# Patient Record
Sex: MALE | Race: OTHER
[De-identification: names, ages, dates, MRNs, and addresses within clinical notes are randomized per-mention and may not be internally consistent; named-entity substitution may affect disease eponyms.]

---

## 2018-01-16 ENCOUNTER — HOSPITAL ENCOUNTER (EMERGENCY)
Dept: HOSPITAL 74 - FER | Age: 43
Discharge: HOME | End: 2018-01-16
Payer: COMMERCIAL

## 2018-01-16 VITALS — BODY MASS INDEX: 37 KG/M2

## 2018-01-16 VITALS — HEART RATE: 92 BPM | DIASTOLIC BLOOD PRESSURE: 91 MMHG | TEMPERATURE: 99.3 F | SYSTOLIC BLOOD PRESSURE: 139 MMHG

## 2018-01-16 DIAGNOSIS — R51: Primary | ICD-10-CM

## 2018-01-16 DIAGNOSIS — I10: ICD-10-CM

## 2018-01-16 NOTE — PDOC
History of Present Illness





- General


Chief Complaint: Headache


Stated Complaint: HEADACHE


Time Seen by Provider: 01/16/18 11:25


History Source: Patient (Patient walked in complaining of headaches since he 

finished his BP medication 3 days ago. The new one will arrive in a couple of 

days)


Exam Limitations: No Limitations





- History of Present Illness


Timing/Duration: reports: 24 hours


Severity: Yes: moderate


Associated Symptoms: reports: denies symptoms





Past History





- Travel


Traveled outside of the country in the last 30 days: No


Close contact w/someone who was outside of country & ill: No





- Past Medical History


Allergies/Adverse Reactions: 


 Allergies











Allergy/AdvReac Type Severity Reaction Status Date / Time


 


No Known Allergies Allergy   Verified 01/16/18 11:27











Home Medications: 


Ambulatory Orders





Acetaminophen [Tylenol -] 1,000 mg PO ONCE PRN 01/16/18 


Lisinopril/Hydrochlorothiazide [Lisinopril-Hctz 20-12.5 mg Tab] 1 each PO AM #

10 tablet 01/16/18 


Lisinopril/Hydrochlorothiazide [Lisinopril-Hctz 20-12.5 mg Tab] 1 each PO DAILY 

01/16/18 








COPD: No


HTN: Yes


Other medical history: migraine





- Suicide/Smoking/Psychosocial Hx


Smoking History: Never smoked


Have you smoked in the past 12 months: No


Information on smoking cessation initiated: No


Hx Alcohol Use: No


Drug/Substance Use Hx: No


Substance Use Type: None





**Review of Systems





- Review of Systems


Able to Perform ROS?: Yes


Is the patient limited English proficient: Yes


Constitutional: Yes: See HPI


HEENTM: Yes: See HPI


Respiratory: No: Symptoms reported, See HPI, Cough, Orthopnea, Shortness of 

Breath, SOB with Exertion, SOB at Rest, Stridor, Wheezing, Productive cough, 

Hemoptysis, Other


Cardiac (ROS): No: Symptoms Reported, See HPI, Chest Pain, Edema, Irregular 

Heart Rate, Lightheadedness, Palpitations, Syncope, Chest Tightness, Other


ABD/GI: No: Symptoms Reported, See HPI, Abdominal Distended, Abd. Pain w/ 

defecation, Blood Streaked Bowels, Constipated, Diarrhea, Difficulty Swallowing

, Nausea, Poor Appetite, Poor Fluid Intake, Rectal Bleeding, Vomiting, 

Indigestion, Abdominal cramping, Tarry Stools, Other


Neurological: Yes: Symptoms reported, See HPI


All Other Systems: Reviewed and Negative





*Physical Exam





- Vital Signs


 Last Vital Signs











Temp Pulse Resp BP Pulse Ox


 


 99.3 F   92 H  20   139/91   99 


 


 01/16/18 11:24  01/16/18 11:24  01/16/18 11:24  01/16/18 11:24  01/16/18 11:24














- Physical Exam


General Appearance: Yes: Nourished, Appropriately Dressed, Moderate Distress


HEENT: positive: AMBER, Normal Voice


Neck: positive: Trachea midline, Supple


Respiratory/Chest: positive: Lungs Clear, Normal Breath Sounds


Cardiovascular: positive: Regular Rate, S1, S2


Lymphatic: negative: Adenopathy


Musculoskeletal: positive: Normal Inspection


Extremity: positive: Normal Capillary Refill


Integumentary: positive: Normal Color, Dry


Neurologic: positive: CNs II-XII NML intact, Fully Oriented, Alert, Normal Mood/

Affect





Medical Decision Making





- Medical Decision Making


Patient seen imediately from arrival, working diagnosis: headache, hypertension 

non compliance with medication


Patient received medication, improvement of symptoms CT head = negative


01/18/18 11:57





01/18/18 11:59








*DC/Admit/Observation/Transfer


Diagnosis at time of Disposition: 


Headache


Qualifiers:


 Headache type: unspecified Headache chronicity pattern: unspecified pattern 

Intractability: not intractable Qualified Code(s): R51 - Headache





Hypertension


Qualifiers:


 Hypertension type: unspecified Qualified Code(s): I10 - Essential (primary) 

hypertension








- Discharge Dispostion


Disposition: HOME


Condition at time of disposition: Improved





- Prescriptions


Prescriptions: 


Lisinopril/Hydrochlorothiazide [Lisinopril-Hctz 20-12.5 mg Tab] 1 each PO AM #

10 tablet





- Referrals





- Patient Instructions


Printed Discharge Instructions:  High Blood Pressure, DI for Headache


Additional Instructions: 


Take your medication on daily basis





- Post Discharge Activity


Forms/Work/School Notes:  Back to Work

## 2019-12-03 ENCOUNTER — HOSPITAL ENCOUNTER (EMERGENCY)
Dept: HOSPITAL 74 - FER | Age: 44
Discharge: HOME | End: 2019-12-03
Payer: COMMERCIAL

## 2019-12-03 VITALS — DIASTOLIC BLOOD PRESSURE: 82 MMHG | HEART RATE: 68 BPM | SYSTOLIC BLOOD PRESSURE: 123 MMHG | TEMPERATURE: 98.4 F

## 2019-12-03 VITALS — BODY MASS INDEX: 36.6 KG/M2

## 2019-12-03 DIAGNOSIS — T54.91XA: ICD-10-CM

## 2019-12-03 DIAGNOSIS — T23.422A: Primary | ICD-10-CM
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
## 2019-12-03 DIAGNOSIS — I10: ICD-10-CM

## 2019-12-03 NOTE — PDOC
History of Present Illness





- General


Chief Complaint: Rash


Stated Complaint: CLOROX SPILT ON FINGER


Time Seen by Provider: 12/03/19 14:46


History Source: Patient


Exam Limitations: No Limitations





- History of Present Illness


Initial Comments: 





12/03/19 14:57


HPI


44-year-old male with history of hypertension presenting with chemical burn to 

his left dorsal aspect of thumb.  He works on campus in the laundry department 

when a drop of Clorox got onto his left thumb.  He immediately washed it with 

soap and water copiously, applied bacitracin and bandaid.  Patient denies any 

pain or swelling, redness, fevers, discharge, wounds or laceration, paresthesias

, numbness or tingling/weakness.





Review of Systems 


Constitutional: no fevers or chills.


MUSCULOSKELETAL: No joint pain and swelling. No muscle pain/arthralgias.


Back: no back pain


SKIN: no redness or skin changes, no discharge, no rash. No wounds.


Hematologic: no easy bruising/bleeding. 


NEUROLOGIC: No weakness, numbness or tingling. 


Allergic/Immunologic: no allergies


All other systems reviewed and negative, or as documented in HPI. 





Laceration physical template 


General: NAD, well appearing


Vascular: 2+ radialis pulses symmetric and equal.


Neuro: distal  strength 5/5. sensation grossly intact in median/radial/

ulnar distribution. 


MSK: soft compartments, Cap refill <2 sec.  2+ radialis pulses bilaterally and 

symmetric. left thumb IP joint intact, 5/5 strength against resistance. no 

joint tenderness. FROM. no wound or skin discoloration.


Skin:  color normal color, warm and well perfused. no skin discoloration, no 

wound or drainage. 











12/03/19 15:02








Past History





- Past Medical History


Allergies/Adverse Reactions: 


 Allergies











Allergy/AdvReac Type Severity Reaction Status Date / Time


 


No Known Allergies Allergy   Verified 12/03/19 14:38











Home Medications: 


Ambulatory Orders





Lisinopril/Hydrochlorothiazide [Lisinopril-Hctz 20-12.5 mg Tab] 1 each PO AM #

10 tablet 01/16/18 








COPD: No


HTN: Yes





- Psycho Social/Smoking Cessation Hx


Smoking History: Never smoked


Have you smoked in the past 12 months: No


Hx Alcohol Use: Yes (OCASIONAL)


Drug/Substance Use Hx: No


Substance Use Type: None





*Physical Exam





- Vital Signs


 Last Vital Signs











Temp Pulse Resp BP Pulse Ox


 


 98.4 F   68   16   123/82   97 


 


 12/03/19 14:38  12/03/19 14:38  12/03/19 14:38  12/03/19 14:38  12/03/19 14:38














Medical Decision Making





- Medical Decision Making





12/03/19 15:04


Vital Signs











Temp Pulse Resp BP Pulse Ox


 


 98.4 F   68   16   123/82   97 


 


 12/03/19 14:38  12/03/19 14:38  12/03/19 14:38  12/03/19 14:38  12/03/19 14:38





Vital signs reviewed within normal limits.  Patient presents with a minor 

chemical burn to his left thumb, no skin lesions or discoloration, 

neurovascularly intact, not complaining of any pain.  Area was carefully 

evaluated and no findings to suspect infection or significant chemical burn.  

Patient also had a drop of exposure to Clorox which is diluted alkali substance/

sodium hypochlorite.  Will discharge patient in stable condition, wound care 

instructions, burn care instructions were advised.  Patient is already filling 

out paperwork/incident report that was filed by his supervisor.  Monitor for 

signs of infection including fever/chills, skin changes, wounds, worsening burn

, neurologic changes/weakness/paresthesias.





Discharge





- Discharge Information


Problems reviewed: Yes


Clinical Impression/Diagnosis: 


Chemical burn of finger


Qualifiers:


 Encounter type: initial encounter Laterality: left Qualified Code(s): T23.422A 

- Corrosion of unspecified degree of single left finger (nail) except thumb, 

initial encounter





Condition: Stable


Disposition: HOME





- Admission


No





- Follow up/Referral





- Patient Discharge Instructions


Patient Printed Discharge Instructions:  How to Take Care of a Burn, Minor 

Carter (Alternative Therapy)


Additional Instructions: 


Wound dressed with topical Bacitracin and sterile gauze/bandaid. Follow up with 

your primary care doctor within 48-72 hours for a wound check. Apply bacitracin 

or neosporin twice a day with warm soaks and cover with gauze/dressings. Return 

to ED for suture removal 7 days. Return to the ED for any worsening pain, 

redness, streaking (red lines), swelling, fever or chills.


Keep the wound clean and as dry as possible. Do not immerse or soak the wound 

in water. This means no swimming, washing dishes (unless thick rubber gloves 

are used), baths, or hot tubs until the stitches are removed or after about two 

weeks if absorbable suture material was used. Leave original bandages on the 

wound for the first 24 hours. After this time, showering or rinsing is 

recommended, rather than bathing. the first day, remove old bandages and gently 

cleanse the wound with soap and water. 








- Post Discharge Activity

## 2020-03-06 ENCOUNTER — HOSPITAL ENCOUNTER (EMERGENCY)
Dept: HOSPITAL 74 - FER | Age: 45
Discharge: HOME | End: 2020-03-06
Payer: COMMERCIAL

## 2020-03-06 VITALS — TEMPERATURE: 98.1 F | HEART RATE: 101 BPM | SYSTOLIC BLOOD PRESSURE: 118 MMHG | DIASTOLIC BLOOD PRESSURE: 71 MMHG

## 2020-03-06 VITALS — BODY MASS INDEX: 37.4 KG/M2

## 2020-03-06 DIAGNOSIS — I10: ICD-10-CM

## 2020-03-06 DIAGNOSIS — H01.9: Primary | ICD-10-CM

## 2020-03-06 NOTE — PDOC
Attending Attestation





- Resident


Resident Name: Vitaly Melendez





- HPI


HPI: 





03/06/20 15:23


Pt presents to the ED after a "drop" of 1:10 bleach solution splashed onto his 

eyelid.  Patient washed his eyes in the eyewash station and now states that his 

eyes feel slightly irritated but has no visual complaints. 





- Physicial Exam


PE: 





03/06/20 15:27


Agree with resident exam.  Patient is alert and oriented and in no acute 

distress.  Eye exam: 20/25 visual acuity b/l.  EOMI. 


03/06/20 15:28





03/06/20 15:42








- Medical Decision Making





03/06/20 16:02


Pt presents to the ED after minimal eye exposure to cleaning fluid with bleach. 

Eyes irrigated.  Visual acuity is intact.  Will discharge home with instructions

to return to the ED for worsening symptoms.

## 2020-03-06 NOTE — PDOC
History of Present Illness





- General


Chief Complaint: Eye Problem


Stated Complaint: BLEACH SPILL TO LEFT EYE


Time Seen by Provider: 03/06/20 13:51


History Source: Patient


Exam Limitations: No Limitations





- History of Present Illness


Initial Comments: 





45 yo M with no pmhx presents to the emergency department after bleach contact 

to the left eye at approximately 1:30 pm. Per the patient, he states he was 

cleaning the ultrasound lobby with microkill bleach wipes, which are 1:10 bleach

solution (0.65% sodium hypochlorite), when he felt a droplet land on his upper 

eyelid. He denies feeling it hit his eye. Per the patient, he states he came 

over to the emergency department and used the eye wash station and is now 

concerned if he has bleach in contact with his eye. He denies eye pain, visual 

disturbance, and blurred vision. Denies headaches, nausea, vomiting, and 

abdominal pain. 








Past History





- Past Medical History


Allergies/Adverse Reactions: 


                                    Allergies











Allergy/AdvReac Type Severity Reaction Status Date / Time


 


No Known Allergies Allergy   Verified 03/06/20 13:47











Home Medications: 


Ambulatory Orders





Lisinopril/Hydrochlorothiazide [Lisinopril-Hctz 20-12.5 mg Tab] 1 each PO AM #10

tablet 01/16/18 








COPD: No


HTN: Yes





- Psycho Social/Smoking Cessation Hx


Smoking History: Never smoked


Have you smoked in the past 12 months: No


Hx Alcohol Use: No


Drug/Substance Use Hx: No


Substance Use Type: None





**Review of Systems





- Review of Systems


Able to Perform ROS?: Yes


Is the patient limited English proficient: No


Constitutional: No: Chills, Diaphoresis, Fever, Weakness


HEENTM: No: Eye Pain, Ear Pain, Nose Pain, Throat Pain, Mouth Pain


Respiratory: No: Cough, Shortness of Breath, Hemoptysis


Cardiac (ROS): No: Chest Pain, Lightheadedness, Palpitations, Chest Tightness


ABD/GI: No: Constipated, Diarrhea, Nausea, Rectal Bleeding, Vomiting, Tarry 

Stools


: No: Burning, Dysuria, Hematuria


Musculoskeletal: No: Back Pain, Joint Pain, Neck Pain


Integumentary: No: Bruising, Erythema, Rash


Neurological: No: Headache, Numbness, Tingling


Psychiatric: No: Change in Appetite


Endocrine: No: Unexplained Weight Loss





*Physical Exam





- Physical Exam


General Appearance: Yes: Nourished, Appropriately Dressed.  No: Apparent 

Distress, Intoxicated, Obese


HEENT: positive: EOMI, AMBER, Normal Voice, Symmetrical, Pharynx Normal, Hearing 

Grossly Normal.  negative: Pale Conjunctivae, Scleral Icterus (R), Scleral 

Icterus (L), Muffled/Hoarse voice, Pharyngeal Erythema, Tonsillar Exudate, 

Tonsillar Erythema, Nasal Congestion, Rhinorrhea, Sinus Tenderness, Excessive 

drooling


Neck: positive: Trachea midline, Supple.  negative: Tender, Lymphadenopathy (R),

Lymphadenopathy (L), Tender lateral, Tender midline


Respiratory/Chest: positive: Lungs Clear, Normal Breath Sounds.  negative: Chest

Tender, Respiratory Distress, Accessory Muscle Use, Rales, Rhonchi, Stridor


Cardiovascular: positive: Regular Rhythm, Regular Rate, S1, S2.  negative: 

Systolic Murmur


Gastrointestinal/Abdominal: positive: Normal Bowel Sounds, Flat, Soft.  

negative: Tender, Distended, Guarding, Rebound, Tenderness


Lymphatic: negative: Adenopathy


Musculoskeletal: positive: Normal Inspection.  negative: CVA Tenderness, 

Vertebral Tenderness


Extremity: positive: Normal Capillary Refill, Normal Inspection, Normal Range of

Motion.  negative: Tender


Integumentary: positive: Normal Color, Dry, Warm


Neurologic: positive: CNs II-XII NML intact, Fully Oriented, Alert, Normal 

Mood/Affect





Medical Decision Making





- Medical Decision Making





45 yo M with no pmhx presents to the emergency department after bleach contact 

to the left eye at approximately 1:30 pm. 





Initial vitals:


                               Initial Vital Signs











Temp Pulse Resp BP Pulse Ox


 


 98.1 F   101 H  16   118/71   100 


 


 03/06/20 13:30  03/06/20 13:30  03/06/20 13:30  03/06/20 13:30  03/06/20 13:30








Work up:


patient has no pain or tenderness to the left eye


on visual inspection, no abnormalities noted


patient denies pain in the eyes. 20/25 vision noted bilaterally


pulse was re-assessed and shown to be 88 bpm. 


Patient to be discharged with PMD follow up. 











Discharge





- Discharge Information


Problems reviewed: Yes


Clinical Impression/Diagnosis: 


 Irritation of eyelid





Condition: Good


Disposition: HOME





- Admission


No





- Follow up/Referral


Referrals: 


Manohar Alvares MD [Primary Care Provider] - 





- Patient Discharge Instructions


Patient Printed Discharge Instructions:  DI for Chemical Eye Burn


Additional Instructions: 


You were seen in the emergency department for the evaluation of your eye. Please

follow up with your primary medical doctor within 1 week after discharge for 

follow up care and management. Please return to the emergency department if you 

have worsening symptoms or new concerning symptoms such as inability to see or 

decreased vision. Thank you. 





- Post Discharge Activity

## 2022-05-23 ENCOUNTER — HOSPITAL ENCOUNTER (EMERGENCY)
Dept: HOSPITAL 74 - FER | Age: 47
Discharge: HOME | End: 2022-05-23
Payer: COMMERCIAL

## 2022-05-23 VITALS — DIASTOLIC BLOOD PRESSURE: 70 MMHG | HEART RATE: 70 BPM | SYSTOLIC BLOOD PRESSURE: 122 MMHG

## 2022-05-23 VITALS — BODY MASS INDEX: 38.2 KG/M2

## 2022-05-23 VITALS — TEMPERATURE: 99 F

## 2022-05-23 DIAGNOSIS — R07.9: Primary | ICD-10-CM

## 2022-05-23 LAB
ALBUMIN SERPL-MCNC: 3.5 G/DL (ref 3.4–5)
ALP SERPL-CCNC: 121 U/L (ref 45–117)
ALT SERPL-CCNC: 34 U/L (ref 13–61)
ANION GAP SERPL CALC-SCNC: 5 MMOL/L (ref 8–16)
AST SERPL-CCNC: 24 U/L (ref 15–37)
BILIRUB SERPL-MCNC: 0.9 MG/DL (ref 0.2–1)
BUN SERPL-MCNC: 20.5 MG/DL (ref 7–18)
CALCIUM SERPL-MCNC: 9.1 MG/DL (ref 8.5–10.1)
CHLORIDE SERPL-SCNC: 104 MMOL/L (ref 98–107)
CO2 SERPL-SCNC: 29 MMOL/L (ref 21–32)
CREAT SERPL-MCNC: 0.9 MG/DL (ref 0.55–1.3)
DEPRECATED RDW RBC AUTO: 13.8 % (ref 11.9–15.9)
GLUCOSE SERPL-MCNC: 117 MG/DL (ref 74–106)
HCT VFR BLD CALC: 44.4 % (ref 35.4–49)
HGB BLD-MCNC: 15.8 G/DL (ref 11.7–16.9)
MCH RBC QN AUTO: 30.2 PG (ref 25.7–33.7)
MCHC RBC AUTO-ENTMCNC: 35.6 G/DL (ref 32–35.9)
MCV RBC: 84.8 FL (ref 80–96)
PLATELET # BLD AUTO: 218.6 10^3/UL (ref 134–434)
PLATELET BLD QL SMEAR: ADEQUATE
PMV BLD: 9.7 FL (ref 7.5–11.1)
PROT SERPL-MCNC: 7.3 G/DL (ref 6.4–8.2)
RBC # BLD AUTO: 5.23 10^6/UL (ref 4–5.6)
SODIUM SERPL-SCNC: 138 MMOL/L (ref 136–145)
WBC # BLD AUTO: 9.2 10^3/UL (ref 4–10.8)

## 2022-08-30 PROBLEM — Z00.00 ENCOUNTER FOR PREVENTIVE HEALTH EXAMINATION: Status: ACTIVE | Noted: 2022-08-30

## 2022-08-31 ENCOUNTER — APPOINTMENT (OUTPATIENT)
Dept: CT IMAGING | Facility: HOSPITAL | Age: 47
End: 2022-08-31

## 2022-08-31 ENCOUNTER — OUTPATIENT (OUTPATIENT)
Dept: OUTPATIENT SERVICES | Facility: HOSPITAL | Age: 47
LOS: 1 days | End: 2022-08-31
Payer: COMMERCIAL

## 2022-08-31 LAB — POCT ISTAT CREATININE: 1 MG/DL — SIGNIFICANT CHANGE UP (ref 0.5–1.3)

## 2022-08-31 PROCEDURE — 82565 ASSAY OF CREATININE: CPT

## 2022-08-31 PROCEDURE — 75574 CT ANGIO HRT W/3D IMAGE: CPT

## 2022-08-31 PROCEDURE — 75574 CT ANGIO HRT W/3D IMAGE: CPT | Mod: 26
